# Patient Record
Sex: MALE | Race: WHITE | NOT HISPANIC OR LATINO | URBAN - METROPOLITAN AREA
[De-identification: names, ages, dates, MRNs, and addresses within clinical notes are randomized per-mention and may not be internally consistent; named-entity substitution may affect disease eponyms.]

---

## 2023-05-31 ENCOUNTER — APPOINTMENT (RX ONLY)
Dept: URBAN - METROPOLITAN AREA OTHER 11 | Facility: OTHER | Age: 73
Setting detail: DERMATOLOGY
End: 2023-05-31

## 2023-05-31 DIAGNOSIS — L82.1 OTHER SEBORRHEIC KERATOSIS: ICD-10-CM

## 2023-05-31 DIAGNOSIS — L82.0 INFLAMED SEBORRHEIC KERATOSIS: ICD-10-CM

## 2023-05-31 DIAGNOSIS — Z12.83 ENCOUNTER FOR SCREENING FOR MALIGNANT NEOPLASM OF SKIN: ICD-10-CM

## 2023-05-31 DIAGNOSIS — L57.0 ACTINIC KERATOSIS: ICD-10-CM

## 2023-05-31 DIAGNOSIS — D22 MELANOCYTIC NEVI: ICD-10-CM

## 2023-05-31 DIAGNOSIS — B35.3 TINEA PEDIS: ICD-10-CM

## 2023-05-31 PROBLEM — D22.5 MELANOCYTIC NEVI OF TRUNK: Status: ACTIVE | Noted: 2023-05-31

## 2023-05-31 PROCEDURE — ? LIQUID NITROGEN

## 2023-05-31 PROCEDURE — ? TREATMENT REGIMEN

## 2023-05-31 PROCEDURE — 17000 DESTRUCT PREMALG LESION: CPT | Mod: 59

## 2023-05-31 PROCEDURE — 17110 DESTRUCTION B9 LES UP TO 14: CPT

## 2023-05-31 PROCEDURE — 99203 OFFICE O/P NEW LOW 30 MIN: CPT | Mod: 25

## 2023-05-31 PROCEDURE — ? COUNSELING

## 2023-05-31 ASSESSMENT — LOCATION SIMPLE DESCRIPTION DERM
LOCATION SIMPLE: RIGHT CHEEK
LOCATION SIMPLE: POSTERIOR SCALP
LOCATION SIMPLE: SCALP
LOCATION SIMPLE: RIGHT UPPER BACK
LOCATION SIMPLE: LEFT UPPER BACK
LOCATION SIMPLE: RIGHT GREAT TOE
LOCATION SIMPLE: LEFT GREAT TOE

## 2023-05-31 ASSESSMENT — LOCATION ZONE DERM
LOCATION ZONE: TRUNK
LOCATION ZONE: TOE
LOCATION ZONE: SCALP
LOCATION ZONE: FACE
LOCATION ZONE: TOENAIL

## 2023-05-31 ASSESSMENT — LOCATION DETAILED DESCRIPTION DERM
LOCATION DETAILED: LEFT SUPERIOR PARIETAL SCALP
LOCATION DETAILED: RIGHT SUPERIOR UPPER BACK
LOCATION DETAILED: LEFT GREAT TOENAIL
LOCATION DETAILED: POSTERIOR MID-PARIETAL SCALP
LOCATION DETAILED: RIGHT SUPERIOR MEDIAL MALAR CHEEK
LOCATION DETAILED: RIGHT DORSAL GREAT TOE
LOCATION DETAILED: LEFT MID-UPPER BACK

## 2023-05-31 NOTE — PROCEDURE: LIQUID NITROGEN
Duration Of Freeze Thaw-Cycle (Seconds): 2
Consent: The patient's consent was obtained including but not limited to risks of crusting, scabbing, blistering, scarring, darker or lighter pigmentary change, recurrence, incomplete removal and infection.
Show Applicator Variable?: Yes
Detail Level: Detailed
Post-Care Instructions: I reviewed with the patient in detail post-care instructions. Patient is to wear sunprotection, and avoid picking at any of the treated lesions. Pt may apply Vaseline to crusted or scabbing areas.
Number Of Freeze-Thaw Cycles: 2 freeze-thaw cycles
Render Post-Care Instructions In Note?: no
Medical Necessity Information: It is in your best interest to select a reason for this procedure from the list below. All of these items fulfill various CMS LCD requirements except the new and changing color options.
Spray Paint Text: The liquid nitrogen was applied to the skin utilizing a spray paint frosting technique.
Medical Necessity Clause: This procedure was medically necessary because the lesions that were treated were:

## 2023-10-12 NOTE — HPI: FULL BODY SKIN EXAMINATION
Medication List            Accurate as of October 12, 2023  9:32 AM. Always use your most recent med list.                albuterol 2.5 mg /3 mL (0.083 %) nebulizer solution  Medication Adjustments for Surgery: Take morning of surgery with sip of water, no other fluids     amoxicillin-pot clavulanate 875-125 mg tablet  Commonly known as: Augmentin  Take 1 tablet (875 mg) by mouth 2 times a day for 12 days.  Medication Adjustments for Surgery: Take morning of surgery with sip of water, no other fluids     budesonide-formoteroL 160-4.5 mcg/actuation inhaler  Commonly known as: Symbicort  Medication Adjustments for Surgery: Take morning of surgery with sip of water, no other fluids     cholecalciferol 50 mcg (2,000 unit) capsule  Commonly known as: Vitamin D-3  Medication Adjustments for Surgery: Stop 7 days before surgery     cyanocobalamin 1,000 mcg tablet  Commonly known as: Vitamin B-12  Medication Adjustments for Surgery: Stop 7 days before surgery     docusate sodium 100 mg capsule  Commonly known as: Colace  Medication Adjustments for Surgery: Take morning of surgery with sip of water, no other fluids     famotidine 40 mg tablet  Commonly known as: Pepcid  Medication Adjustments for Surgery: Take morning of surgery with sip of water, no other fluids     L. ACIDOPHILUS/BIFID. ANIMALIS ORAL  Medication Adjustments for Surgery: Stop 7 days before surgery     montelukast 10 mg tablet  Commonly known as: Singulair  Medication Adjustments for Surgery: Take morning of surgery with sip of water, no other fluids     Myrbetriq 50 mg tablet extended release 24 hr 24 hr tablet  Generic drug: mirabegron  Medication Adjustments for Surgery: Take morning of surgery with sip of water, no other fluids     oxybutynin XL 15 mg 24 hr tablet  Commonly known as: Ditropan-XL  Medication Adjustments for Surgery: Take morning of surgery with sip of water, no other fluids     polyethylene glycol 17 gram/dose powder  Commonly known as: 
What Is The Reason For Today's Visit?: Full Body Skin Examination
Glycolax, Miralax  Medication Adjustments for Surgery: Stop 1 day before surgery     Slow Fe 142 mg ER tablet  Generic drug: ferrous sulfate ER  Medication Adjustments for Surgery: Stop 7 days before surgery                              NPO Instructions:    Do not eat any food after midnight the night before your surgery/procedure.  You may have clear liquids until TWO hours before surgery/procedure. This includes water, black tea/coffee, (no milk or cream) apple juice and electrolyte drinks (Gatorade).    Additional Instructions:     Seven/Six Days before Surgery:  We have sent a prescription for Hibiclens soap to your preferred pharmacy.  If you have not already, Please  your prescription and start using five days before surgery.  Follow the instruction sheet provided to you at your CPM/PAT appointment.  Review your medication instructions, stop indicated medications  Five Days before Surgery:  Review your medication instructions, stop indicated medications  Three Days before Surgery:  Review your medication instructions, stop indicated medications  The Day before Surgery:  Review your medication instructions, stop indicated medications  You will be contacted regarding the time of your arrival to facility and surgery time  Do not eat any food after Midnight  Day of Surgery:  Review your medication instructions, take indicated medications  You may have clear liquids until TWO hours before surgery/procedure.  This includes water, black tea/coffee, (no milk or cream) apple juice and electrolyte drinks (Gatorade)  Wear  comfortable loose fitting clothing  Do not use moisturizers, creams, lotions or perfume  All jewelry and valuables should be left at home                                                
What Is The Reason For Today's Visit? (Being Monitored For X): concerning skin lesions on an annual basis